# Patient Record
Sex: FEMALE | Race: BLACK OR AFRICAN AMERICAN | NOT HISPANIC OR LATINO | URBAN - METROPOLITAN AREA
[De-identification: names, ages, dates, MRNs, and addresses within clinical notes are randomized per-mention and may not be internally consistent; named-entity substitution may affect disease eponyms.]

---

## 2020-12-02 ENCOUNTER — OUT OF OFFICE VISIT (OUTPATIENT)
Dept: URBAN - METROPOLITAN AREA MEDICAL CENTER 23 | Facility: MEDICAL CENTER | Age: 80
End: 2020-12-02
Payer: MEDICARE

## 2020-12-02 DIAGNOSIS — R13.19 CERVICAL DYSPHAGIA: ICD-10-CM

## 2020-12-02 DIAGNOSIS — R10.13 ABDOMINAL DISCOMFORT, EPIGASTRIC: ICD-10-CM

## 2020-12-02 DIAGNOSIS — D64.89 ANEMIA DUE TO OTHER CAUSE: ICD-10-CM

## 2020-12-02 DIAGNOSIS — R19.5 ABNORMAL FECES: ICD-10-CM

## 2020-12-02 PROCEDURE — G8427 DOCREV CUR MEDS BY ELIG CLIN: HCPCS | Performed by: INTERNAL MEDICINE

## 2020-12-02 PROCEDURE — 99223 1ST HOSP IP/OBS HIGH 75: CPT | Performed by: INTERNAL MEDICINE

## 2020-12-02 PROCEDURE — 99232 SBSQ HOSP IP/OBS MODERATE 35: CPT | Performed by: INTERNAL MEDICINE

## 2021-02-05 ENCOUNTER — OUT OF OFFICE VISIT (OUTPATIENT)
Dept: URBAN - NONMETROPOLITAN AREA MEDICAL CENTER 3 | Facility: MEDICAL CENTER | Age: 81
End: 2021-02-05
Payer: MEDICARE

## 2021-02-05 DIAGNOSIS — R13.10 ABNORMAL SWALLOWING: ICD-10-CM

## 2021-02-05 DIAGNOSIS — D50.9 ANEMIA, IRON DEFICIENCY: ICD-10-CM

## 2021-02-05 DIAGNOSIS — I48.0 INTERMITTENT ATRIAL FIBRILLATION: ICD-10-CM

## 2021-02-05 DIAGNOSIS — Z79.01 ANTICOAGULANT LONG-TERM USE: ICD-10-CM

## 2021-02-05 PROCEDURE — 99223 1ST HOSP IP/OBS HIGH 75: CPT | Performed by: PHYSICIAN ASSISTANT

## 2021-02-05 PROCEDURE — G8427 DOCREV CUR MEDS BY ELIG CLIN: HCPCS | Performed by: PHYSICIAN ASSISTANT

## 2021-02-06 ENCOUNTER — OUT OF OFFICE VISIT (OUTPATIENT)
Dept: URBAN - NONMETROPOLITAN AREA MEDICAL CENTER 3 | Facility: MEDICAL CENTER | Age: 81
End: 2021-02-06
Payer: MEDICARE

## 2021-02-06 DIAGNOSIS — T18.128A FOOD IMPACTION OF ESOPHAGUS: ICD-10-CM

## 2021-02-06 DIAGNOSIS — K22.2 ACQUIRED ESOPHAGEAL RING: ICD-10-CM

## 2021-02-06 PROCEDURE — 43235 EGD DIAGNOSTIC BRUSH WASH: CPT | Performed by: INTERNAL MEDICINE

## 2021-07-08 ENCOUNTER — OUT OF OFFICE VISIT (OUTPATIENT)
Dept: URBAN - METROPOLITAN AREA MEDICAL CENTER 10 | Facility: MEDICAL CENTER | Age: 81
End: 2021-07-08
Payer: MEDICARE

## 2021-07-08 DIAGNOSIS — R55 BLACK-OUT (NOT AMNESIA): ICD-10-CM

## 2021-07-08 DIAGNOSIS — I48.91 A-FIB: ICD-10-CM

## 2021-07-08 DIAGNOSIS — D50.9 ANEMIA, IRON DEFICIENCY: ICD-10-CM

## 2021-07-08 PROCEDURE — G8427 DOCREV CUR MEDS BY ELIG CLIN: HCPCS | Performed by: INTERNAL MEDICINE

## 2021-07-08 PROCEDURE — 99222 1ST HOSP IP/OBS MODERATE 55: CPT | Performed by: INTERNAL MEDICINE

## 2021-07-09 ENCOUNTER — OUT OF OFFICE VISIT (OUTPATIENT)
Dept: URBAN - METROPOLITAN AREA MEDICAL CENTER 10 | Facility: MEDICAL CENTER | Age: 81
End: 2021-07-09
Payer: MEDICARE

## 2021-07-09 DIAGNOSIS — K31.89 ACQUIRED DEFORMITY OF DUODENUM: ICD-10-CM

## 2021-07-09 DIAGNOSIS — R55 BLACK-OUT (NOT AMNESIA): ICD-10-CM

## 2021-07-09 DIAGNOSIS — K31.819 ACQUIRED ARTERIOVENOUS MALFORMATION OF STOMACH: ICD-10-CM

## 2021-07-09 DIAGNOSIS — D62 ACUTE BLOOD LOSS ANEMIA: ICD-10-CM

## 2021-07-09 PROCEDURE — 99231 SBSQ HOSP IP/OBS SF/LOW 25: CPT | Performed by: INTERNAL MEDICINE

## 2021-07-09 PROCEDURE — 43236 UPPR GI SCOPE W/SUBMUC INJ: CPT | Performed by: INTERNAL MEDICINE

## 2021-07-12 ENCOUNTER — OUT OF OFFICE VISIT (OUTPATIENT)
Dept: URBAN - METROPOLITAN AREA MEDICAL CENTER 10 | Facility: MEDICAL CENTER | Age: 81
End: 2021-07-12
Payer: MEDICARE

## 2021-07-12 DIAGNOSIS — D62 ACUTE BLOOD LOSS ANEMIA: ICD-10-CM

## 2021-07-12 DIAGNOSIS — I48.91 A-FIB: ICD-10-CM

## 2021-07-12 DIAGNOSIS — K31.819 ACQUIRED ARTERIOVENOUS MALFORMATION OF STOMACH: ICD-10-CM

## 2021-07-12 PROCEDURE — 99231 SBSQ HOSP IP/OBS SF/LOW 25: CPT | Performed by: INTERNAL MEDICINE

## 2021-09-07 ENCOUNTER — OFFICE VISIT (OUTPATIENT)
Dept: URBAN - METROPOLITAN AREA CLINIC 78 | Facility: CLINIC | Age: 81
End: 2021-09-07
Payer: MEDICARE

## 2021-09-07 VITALS
HEIGHT: 64 IN | DIASTOLIC BLOOD PRESSURE: 94 MMHG | TEMPERATURE: 97.8 F | HEART RATE: 90 BPM | WEIGHT: 117.8 LBS | SYSTOLIC BLOOD PRESSURE: 147 MMHG | BODY MASS INDEX: 20.11 KG/M2

## 2021-09-07 DIAGNOSIS — R42 LIGHTHEADEDNESS: ICD-10-CM

## 2021-09-07 DIAGNOSIS — I48.91 UNSPECIFIED ATRIAL FIBRILLATION: ICD-10-CM

## 2021-09-07 DIAGNOSIS — R13.10 DYSPHAGIA: ICD-10-CM

## 2021-09-07 DIAGNOSIS — Z86.73 H/O: STROKE: ICD-10-CM

## 2021-09-07 DIAGNOSIS — R10.9 ABDOMINAL PAIN: ICD-10-CM

## 2021-09-07 DIAGNOSIS — D64.9 ANEMIA, UNSPECIFIED TYPE: ICD-10-CM

## 2021-09-07 PROCEDURE — 99214 OFFICE O/P EST MOD 30 MIN: CPT | Performed by: INTERNAL MEDICINE

## 2021-09-07 RX ORDER — CARVEDILOL 12.5 MG/1
1 TABLET WITH FOOD TABLET, FILM COATED ORAL TWICE A DAY
Status: ACTIVE | COMMUNITY

## 2021-09-07 NOTE — HPI-TODAY'S VISIT:
I met Mrs. Moreau during an inpatient stay at [facility] in July 2021. She has a history of CVA with left-sided weakness, abdominal aortic aneurysm x2, chronic diastolic congestive heart failure, status post ICD, severe anemia at (baseline Hb~ 7), peptic ulcer disease, dementia, paroxysmal Afib.  She presetned to the hospital with dizziness, nausea and vomiting.  She was found to have a Hb of 6.6.  She was started on PPIs and received 3 units of PRBC transfusion. Her hemoglobin stabilized around 10. 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 Given the concern for additional small bowel AVMs an outpt pillcam was recommended Her anticoagulation was held. She has not yet followed up with Cardiology since her discharge  She tells me that she had been doing well since her discharge. This morning however she had an isolated episode of lightheadeness out of blue. She has been noticing a diffuse pain in her abdomen, 7/10 in intensty.  She denies nausea, diarrehea or constipation. Appetite has been great. She has been having some dysphagia and feels that she should have her esophagus stretched before she attempts to try swallowing the Pillcam.   She last had her blood counts checked ~5 weeks ago. She never heard back regarding those results.    Summary of prior workup: - EGD  by me in july 2021: normal esophagus.  Gastric mucosal atrophy.  Normal cardia and gastric fundus. Duodenal deformity.  A single angioectasia in the duodenum s/p clip and injection with Epi. Duodenal erosions without bleeding. - CT head: no acute process, favored remote right pontine lacunar infarction.  - ECHO: Chronic diastolic congestive heart failure:  LVEF 55% and grade 1 diastolic dysfunction. -Ultrasound of left upper extremity: negative for DVT.

## 2021-09-14 LAB
HEMATOCRIT: 35.3
HEMOGLOBIN: 10.5
MCH: 23.5
MCHC: 29.7
MCV: 79
NRBC: (no result)
PLATELETS: 184
RBC: 4.46
RDW: 25.7
WBC: 5.9

## 2021-10-21 ENCOUNTER — TELEPHONE ENCOUNTER (OUTPATIENT)
Dept: URBAN - METROPOLITAN AREA CLINIC 92 | Facility: CLINIC | Age: 81
End: 2021-10-21

## 2021-10-28 ENCOUNTER — OFFICE VISIT (OUTPATIENT)
Dept: URBAN - METROPOLITAN AREA MEDICAL CENTER 10 | Facility: MEDICAL CENTER | Age: 81
End: 2021-10-28
Payer: MEDICARE

## 2021-10-28 DIAGNOSIS — K31.819 ACQUIRED ARTERIOVENOUS MALFORMATION OF STOMACH: ICD-10-CM

## 2021-10-28 DIAGNOSIS — K22.2 ACQUIRED ESOPHAGEAL RING: ICD-10-CM

## 2021-10-28 DIAGNOSIS — K21.00 ALKALINE REFLUX ESOPHAGITIS: ICD-10-CM

## 2021-10-28 PROCEDURE — 43270 EGD LESION ABLATION: CPT | Performed by: INTERNAL MEDICINE

## 2021-10-28 PROCEDURE — 43239 EGD BIOPSY SINGLE/MULTIPLE: CPT | Performed by: INTERNAL MEDICINE

## 2022-03-02 ENCOUNTER — TELEPHONE ENCOUNTER (OUTPATIENT)
Dept: URBAN - METROPOLITAN AREA CLINIC 78 | Facility: CLINIC | Age: 82
End: 2022-03-02

## 2022-03-25 ENCOUNTER — CLAIMS CREATED FROM THE CLAIM WINDOW (OUTPATIENT)
Dept: URBAN - METROPOLITAN AREA CLINIC 77 | Facility: CLINIC | Age: 82
End: 2022-03-25

## 2022-03-25 ENCOUNTER — OFFICE VISIT (OUTPATIENT)
Dept: URBAN - METROPOLITAN AREA CLINIC 77 | Facility: CLINIC | Age: 82
End: 2022-03-25

## 2022-03-25 ENCOUNTER — CLAIMS CREATED FROM THE CLAIM WINDOW (OUTPATIENT)
Dept: URBAN - METROPOLITAN AREA CLINIC 77 | Facility: CLINIC | Age: 82
End: 2022-03-25
Payer: MEDICARE

## 2022-03-25 DIAGNOSIS — K92.2 ACUTE GASTROINTESTINAL BLEEDING: ICD-10-CM

## 2022-03-25 PROCEDURE — 91110 GI TRC IMG INTRAL ESOPH-ILE: CPT | Performed by: INTERNAL MEDICINE

## 2022-03-25 RX ORDER — CARVEDILOL 12.5 MG/1
1 TABLET WITH FOOD TABLET, FILM COATED ORAL TWICE A DAY
Status: ACTIVE | COMMUNITY

## 2022-03-28 ENCOUNTER — TELEPHONE ENCOUNTER (OUTPATIENT)
Dept: URBAN - METROPOLITAN AREA CLINIC 78 | Facility: CLINIC | Age: 82
End: 2022-03-28

## 2022-04-04 ENCOUNTER — TELEPHONE ENCOUNTER (OUTPATIENT)
Dept: URBAN - METROPOLITAN AREA CLINIC 78 | Facility: CLINIC | Age: 82
End: 2022-04-04

## 2022-04-28 ENCOUNTER — OFFICE VISIT (OUTPATIENT)
Dept: URBAN - METROPOLITAN AREA CLINIC 78 | Facility: CLINIC | Age: 82
End: 2022-04-28

## 2022-09-13 ENCOUNTER — TELEPHONE ENCOUNTER (OUTPATIENT)
Dept: URBAN - METROPOLITAN AREA CLINIC 78 | Facility: CLINIC | Age: 82
End: 2022-09-13

## 2022-09-19 ENCOUNTER — TELEPHONE ENCOUNTER (OUTPATIENT)
Dept: URBAN - METROPOLITAN AREA CLINIC 78 | Facility: CLINIC | Age: 82
End: 2022-09-19

## 2022-09-23 ENCOUNTER — OFFICE VISIT (OUTPATIENT)
Dept: URBAN - METROPOLITAN AREA CLINIC 77 | Facility: CLINIC | Age: 82
End: 2022-09-23

## 2022-10-25 ENCOUNTER — OFFICE VISIT (OUTPATIENT)
Dept: URBAN - METROPOLITAN AREA CLINIC 78 | Facility: CLINIC | Age: 82
End: 2022-10-25
Payer: MEDICARE

## 2022-10-25 VITALS
DIASTOLIC BLOOD PRESSURE: 91 MMHG | HEART RATE: 83 BPM | WEIGHT: 115 LBS | SYSTOLIC BLOOD PRESSURE: 161 MMHG | HEIGHT: 64 IN | BODY MASS INDEX: 19.63 KG/M2 | TEMPERATURE: 98.1 F

## 2022-10-25 DIAGNOSIS — R42 LIGHTHEADEDNESS: ICD-10-CM

## 2022-10-25 DIAGNOSIS — R10.9 ABDOMINAL PAIN: ICD-10-CM

## 2022-10-25 DIAGNOSIS — R13.10 DYSPHAGIA: ICD-10-CM

## 2022-10-25 DIAGNOSIS — I48.91 UNSPECIFIED ATRIAL FIBRILLATION: ICD-10-CM

## 2022-10-25 DIAGNOSIS — Z86.73 H/O: STROKE: ICD-10-CM

## 2022-10-25 DIAGNOSIS — D64.9 ANEMIA, UNSPECIFIED TYPE: ICD-10-CM

## 2022-10-25 PROBLEM — 40739000 DYSPHAGIA: Status: ACTIVE | Noted: 2021-09-07

## 2022-10-25 PROBLEM — 49436004 ATRIAL FIBRILLATION: Status: ACTIVE | Noted: 2021-09-07

## 2022-10-25 PROCEDURE — 99214 OFFICE O/P EST MOD 30 MIN: CPT | Performed by: INTERNAL MEDICINE

## 2022-10-25 RX ORDER — FUROSEMIDE 20 MG/1
1 TABLET TABLET ORAL ONCE A DAY
Status: ACTIVE | COMMUNITY

## 2022-10-25 RX ORDER — CARVEDILOL 12.5 MG/1
1 TABLET WITH FOOD TABLET, FILM COATED ORAL TWICE A DAY
Status: ACTIVE | COMMUNITY

## 2022-10-25 NOTE — PHYSICAL EXAM CONSTITUTIONAL:
well developed, well nourished , in no acute distress , on a wheelchair due to generalized weakness, normal communication ability

## 2022-10-25 NOTE — PHYSICAL EXAM GASTROINTESTINAL
Abdomen , soft, tender to palpation along the epigastric region, nondistended , no guarding or rigidity , no masses palpable , normal bowel sounds , Liver and Spleen , no hepatomegaly present , no hepatosplenomegaly , liver nontender , spleen not palpable

## 2022-10-25 NOTE — HPI-TODAY'S VISIT:
I met Mrs. Moreau during an inpatient stay at [facility] in July 2021. She has a history of CVA with left-sided weakness, abdominal aortic aneurysm x2, chronic diastolic congestive heart failure, status post ICD, severe anemia at (baseline Hb~ 7), peptic ulcer disease, dementia, paroxysmal Afib.  She presented to the hospital with dizziness, nausea and vomiting.  She was found to have a Hb of 6.6. She was started on PPIs and received 3 units of PRBC transfusion. Her hemoglobin stabilized around 10. 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 Given the concern for additional small bowel AVMs an outpt pillcam was recommended  Pillcam unfortunately was unsuccsessful as the pill was retained in the esoph/stomach for almost the duration of the 8 hours.  She has not had melena, but continues to complain of THOMAS and lightheadedness. She denies much in the way of abdominal pain. Appetite has been fair.   She will be seeing Dr. Traore following this visit to discuss recent blood results and determine the further plan of action.   Summary of prior workup: - Pillcam on 3/25/22: The pillcam was retained in the esophagus for 4 h 15 mins and then in the stomach for 2 h 32 mins. Incomplete evaluation of the small intestine as the VCE did not reach the cecum. No overt bleeding noted; no angiectasias/polyps or mass. - EGD by me on 10/28/2021: White esoph lesions suggestive but histopathologically not consistent with Candida esophagitis, a benign-appearing intrinsic stenosis dilated to 18 mm at the GE junction, diffuse atrophic gastritis, patent pyloric channel.  Single small AVM in the duodenal bulb status post APC.  Normal second portion of the duodenum. - CBC on 9/14/2021: WBC 5.9, hemoglobin 10.5, MCV 79, platelets 184. - EGD  by me in july 2021: normal esophagus.  Gastric mucosal atrophy.  Normal cardia and gastric fundus. Duodenal deformity.  A single angioectasia in the duodenum s/p clip and injection with Epi. Duodenal erosions without bleeding. - CT head: no acute process, favored remote right pontine lacunar infarction.  - ECHO: Chronic diastolic congestive heart failure:  LVEF 55% and grade 1 diastolic dysfunction. -Ultrasound of left upper extremity: negative for DVT.

## 2022-11-01 ENCOUNTER — TELEPHONE ENCOUNTER (OUTPATIENT)
Dept: URBAN - METROPOLITAN AREA CLINIC 113 | Facility: CLINIC | Age: 82
End: 2022-11-01

## 2022-11-09 PROBLEM — 87522002 IRON DEFICIENCY ANEMIA: Status: ACTIVE | Noted: 2022-11-09

## 2023-01-02 PROBLEM — 271737000 ANEMIA: Status: ACTIVE | Noted: 2023-01-02

## 2023-01-05 ENCOUNTER — TELEPHONE ENCOUNTER (OUTPATIENT)
Dept: URBAN - METROPOLITAN AREA CLINIC 78 | Facility: CLINIC | Age: 83
End: 2023-01-05

## 2023-01-10 ENCOUNTER — OFFICE VISIT (OUTPATIENT)
Dept: URBAN - METROPOLITAN AREA MEDICAL CENTER 10 | Facility: MEDICAL CENTER | Age: 83
End: 2023-01-10

## 2023-05-30 ENCOUNTER — OFFICE VISIT (OUTPATIENT)
Dept: URBAN - METROPOLITAN AREA CLINIC 25 | Facility: CLINIC | Age: 83
End: 2023-05-30

## 2023-06-09 ENCOUNTER — OFFICE VISIT (OUTPATIENT)
Dept: URBAN - METROPOLITAN AREA CLINIC 25 | Facility: CLINIC | Age: 83
End: 2023-06-09
Payer: MEDICARE

## 2023-06-09 ENCOUNTER — DASHBOARD ENCOUNTERS (OUTPATIENT)
Age: 83
End: 2023-06-09

## 2023-06-09 ENCOUNTER — WEB ENCOUNTER (OUTPATIENT)
Dept: URBAN - METROPOLITAN AREA CLINIC 25 | Facility: CLINIC | Age: 83
End: 2023-06-09

## 2023-06-09 VITALS
BODY MASS INDEX: 19.81 KG/M2 | SYSTOLIC BLOOD PRESSURE: 120 MMHG | HEART RATE: 74 BPM | DIASTOLIC BLOOD PRESSURE: 77 MMHG | WEIGHT: 116 LBS | TEMPERATURE: 97.7 F | HEIGHT: 64 IN

## 2023-06-09 DIAGNOSIS — Z86.73 H/O: STROKE: ICD-10-CM

## 2023-06-09 DIAGNOSIS — Z79.01 CURRENT USE OF LONG TERM ANTICOAGULATION: ICD-10-CM

## 2023-06-09 DIAGNOSIS — R10.9 ABDOMINAL PAIN: ICD-10-CM

## 2023-06-09 DIAGNOSIS — D50.0 IRON DEFICIENCY ANEMIA DUE TO CHRONIC BLOOD LOSS: ICD-10-CM

## 2023-06-09 DIAGNOSIS — I48.91 UNSPECIFIED ATRIAL FIBRILLATION: ICD-10-CM

## 2023-06-09 DIAGNOSIS — I99.8 ANGIECTASIA: ICD-10-CM

## 2023-06-09 PROBLEM — 724556004: Status: ACTIVE | Noted: 2023-06-09

## 2023-06-09 PROCEDURE — 99214 OFFICE O/P EST MOD 30 MIN: CPT

## 2023-06-09 RX ORDER — CLOPIDOGREL BISULFATE 75 MG/1
1 TABLET TABLET ORAL ONCE A DAY
Status: ACTIVE | COMMUNITY

## 2023-06-09 RX ORDER — FERROUS SULFATE TAB 325 MG (65 MG ELEMENTAL FE) 325 (65 FE) MG
1 TABLET TAB ORAL
Status: ACTIVE | COMMUNITY

## 2023-06-09 RX ORDER — MECLIZINE HYDROCHLORIDE 25 MG/1
1 TABLET AS NEEDED TABLET ORAL
Status: ACTIVE | COMMUNITY

## 2023-06-09 RX ORDER — DOCUSATE SODIUM 100 MG/1
1 CAPSULE AS NEEDED CAPSULE, LIQUID FILLED ORAL ONCE A DAY
Status: ACTIVE | COMMUNITY

## 2023-06-09 RX ORDER — FUROSEMIDE 20 MG/1
1 TABLET TABLET ORAL ONCE A DAY
Status: ACTIVE | COMMUNITY

## 2023-06-09 RX ORDER — ATORVASTATIN CALCIUM 10 MG/1
1 TABLET TABLET, FILM COATED ORAL ONCE A DAY
Status: ACTIVE | COMMUNITY

## 2023-06-09 RX ORDER — POTASSIUM CHLORIDE 20 MEQ/15ML
15 ML WITH FOOD SOLUTION ORAL ONCE A DAY
Status: ACTIVE | COMMUNITY

## 2023-06-09 RX ORDER — CARVEDILOL 12.5 MG/1
1 TABLET WITH FOOD TABLET, FILM COATED ORAL TWICE A DAY
Status: ACTIVE | COMMUNITY

## 2023-06-09 RX ORDER — ASPIRIN 81 MG/1
1 TABLET TABLET, COATED ORAL ONCE A DAY
Status: ACTIVE | COMMUNITY

## 2023-06-09 RX ORDER — GLIPIZIDE 5 MG/1
1 TABLET 30 MINUTES BEFORE BREAKFAST TABLET ORAL ONCE A DAY
Status: ACTIVE | COMMUNITY

## 2023-06-09 NOTE — HPI-OTHER HISTORIES
10/22 OV I met Mrs. Moreau during an inpatient stay at [facility] in July 2021. She has a history of CVA with left-sided weakness, abdominal aortic aneurysm x2, chronic diastolic congestive heart failure, status post ICD, severe anemia at (baseline Hb~ 7), peptic ulcer disease, dementia, paroxysmal Afib.  She presented to the hospital with dizziness, nausea and vomiting.  She was found to have a Hb of 6.6. She was started on PPIs and received 3 units of PRBC transfusion. Her hemoglobin stabilized around 10. 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 Given the concern for additional small bowel AVMs an outpt pillcam was recommended  Pillcam unfortunately was unsuccsessful as the pill was retained in the esoph/stomach for almost the duration of the 8 hours.  She has not had melena, but continues to complain of THOMAS and lightheadedness. She denies much in the way of abdominal pain. Appetite has been fair.   She will be seeing Dr. Traore following this visit to discuss recent blood results and determine the further plan of action.   Summary of prior workup: - Pillcam on 3/25/22: The pillcam was retained in the esophagus for 4 h 15 mins and then in the stomach for 2 h 32 mins. Incomplete evaluation of the small intestine as the VCE did not reach the cecum. No overt bleeding noted; no angiectasias/polyps or mass. - EGD by me on 10/28/2021: White esoph lesions suggestive but histopathologically not consistent with Candida esophagitis, a benign-appearing intrinsic stenosis dilated to 18 mm at the GE junction, diffuse atrophic gastritis, patent pyloric channel.  Single small AVM in the duodenal bulb status post APC.  Normal second portion of the duodenum. - CBC on 9/14/2021: WBC 5.9, hemoglobin 10.5, MCV 79, platelets 184. - EGD  by me in july 2021: normal esophagus.  Gastric mucosal atrophy.  Normal cardia and gastric fundus. Duodenal deformity.  A single angioectasia in the duodenum s/p clip and injection with Epi. Duodenal erosions without bleeding. - CT head: no acute process, favored remote right pontine lacunar infarction.  - ECHO: Chronic diastolic congestive heart failure:  LVEF 55% and grade 1 diastolic dysfunction. -Ultrasound of left upper extremity: negative for DVT.

## 2023-06-09 NOTE — HPI-TODAY'S VISIT:
Ms. Moreau is an established patient of Dr. Roman, she has a pmhx of multiple CVA, with residual left sided weakness and dysarthria, anemia, CHF, diabetes, aortic aneurysm, generalized muscle weakness, gait instability, hypertension status post AICD she presents today following a recent stroke in 02/23 where she was evaluated for lightheadedness and weakness, she has a hx of LARISA and gastric angioectasias, repeat EGD/colon was suggested in October, however following patients stroke she was not a candidate for either procedures, previous pillcam study was incomplete d/t retention of pill cam before reaching the cecum. The patient has microcytic anemia, hgb of 9.4, mcv of 74.6, and hct of 30.3, Dr. Lopes diagnosed Ms. Moreau w/ a left-sided colonic ileus in March, however, she is having loose bowel movements and passing gas, the patient is complaining of LLQ abdominal pain  (+) Change in bowel habits  - Daughter present and main historian  - Reports normal stools, however over the last 2 years her stools have become loose, she has been having watery diarrhea consistently, and her regular stools are mushier  - She complains of consistent LLQ abdominal pain  - She will have 14 BM a week, she states that occasionally she will have pebble-like stools, she states this occurs once every few weeks, she also confirms some mucus in her stool  - Patient is capable of passing gas without any issues  - LLQ abdominal pain is intermittent and feels like a cramping aching pain, she experiences this pain daily  - Patient also complains of incontinence, she will have leakage of her stools occasionally   - Patient denies BRBPR, melena/coffee ground stools, unintentional weight loss, N/V  - Patient has been off Eliquis for 7 months now, she was given a watchman installed, she also has a defibrillator in place  - LARISA followed by   - Cardiologist: Dr. Wesley    EGD 10/21  Z-line regular, 40 cm from the incisors. - Benign-appearing esophageal stenosis. Dilated. - Gastric mucosal atrophy. - A single non-bleeding angiodysplasia lesion in the duodenal. Treated with argon (APC). - Normal second portion of the duodenum.  EGD 07/21  Normal cardia and gastric fundus. - Duodenal deformity. - A single angioectasia in the duodenum, Clip was placed. Injected - Normal area of the papilla. - A single non-bleeding angioectasia in the duodenum. - Duodenal erosions without bleeding. - No specimens collected.  Pull cam 2022  1. Delayed passage of the PillCam from the esophagus into the stomach. It was retained in the esophagus for about 4 hours and 15 minutes. 2. Limited visualization of the stomach given large amount of debris. 3. Incomplete evaluation of the small intestine as the PillCam did not reach the cecum. No overt bleeding was noted in the small intestine nor were there any obvious angiectasias, polyps or mass.  CT Head WO IV Contrast 02/23  Final Result 1. No acute intracranial abnormality. 2. Severe chronic microvascular ischemic changes of the brain. 3. Unchanged chronic lacunar infarctions, as detailed above.

## 2023-06-22 PROBLEM — 711150003: Status: ACTIVE | Noted: 2023-06-22

## 2023-06-23 ENCOUNTER — TELEPHONE ENCOUNTER (OUTPATIENT)
Dept: URBAN - METROPOLITAN AREA CLINIC 25 | Facility: CLINIC | Age: 83
End: 2023-06-23

## 2023-06-23 RX ORDER — POTASSIUM CHLORIDE 20 MEQ/15ML
15 ML WITH FOOD SOLUTION ORAL ONCE A DAY
Status: ACTIVE | COMMUNITY

## 2023-06-23 RX ORDER — CLOPIDOGREL BISULFATE 75 MG/1
1 TABLET TABLET ORAL ONCE A DAY
Status: ACTIVE | COMMUNITY

## 2023-06-23 RX ORDER — ATORVASTATIN CALCIUM 10 MG/1
1 TABLET TABLET, FILM COATED ORAL ONCE A DAY
Status: ACTIVE | COMMUNITY

## 2023-06-23 RX ORDER — GLIPIZIDE 5 MG/1
1 TABLET 30 MINUTES BEFORE BREAKFAST TABLET ORAL ONCE A DAY
Status: ACTIVE | COMMUNITY

## 2023-06-23 RX ORDER — POLYETHYLENE GLYCOL 3350, SODIUM SULFATE ANHYDROUS, SODIUM BICARBONATE, SODIUM CHLORIDE, POTASSIUM CHLORIDE 236; 22.74; 6.74; 5.86; 2.97 G/4L; G/4L; G/4L; G/4L; G/4L
AS DIRECTED POWDER, FOR SOLUTION ORAL ONCE
Qty: 1 | Refills: 0 | OUTPATIENT
Start: 2023-07-18 | End: 2023-07-19

## 2023-06-23 RX ORDER — DOCUSATE SODIUM 100 MG/1
1 CAPSULE AS NEEDED CAPSULE, LIQUID FILLED ORAL ONCE A DAY
Status: ACTIVE | COMMUNITY

## 2023-06-23 RX ORDER — ASPIRIN 81 MG/1
1 TABLET TABLET, COATED ORAL ONCE A DAY
Status: ACTIVE | COMMUNITY

## 2023-06-23 RX ORDER — FERROUS SULFATE TAB 325 MG (65 MG ELEMENTAL FE) 325 (65 FE) MG
1 TABLET TAB ORAL
Status: ACTIVE | COMMUNITY

## 2023-06-23 RX ORDER — MECLIZINE HYDROCHLORIDE 25 MG/1
1 TABLET AS NEEDED TABLET ORAL
Status: ACTIVE | COMMUNITY

## 2023-06-23 RX ORDER — FUROSEMIDE 20 MG/1
1 TABLET TABLET ORAL ONCE A DAY
Status: ACTIVE | COMMUNITY

## 2023-06-23 RX ORDER — CARVEDILOL 12.5 MG/1
1 TABLET WITH FOOD TABLET, FILM COATED ORAL TWICE A DAY
Status: ACTIVE | COMMUNITY

## 2023-07-18 ENCOUNTER — LAB OUTSIDE AN ENCOUNTER (OUTPATIENT)
Dept: URBAN - METROPOLITAN AREA CLINIC 25 | Facility: CLINIC | Age: 83
End: 2023-07-18

## 2023-07-25 NOTE — PHYSICAL EXAM CARDIOVASCULAR:
no edema,  no murmurs,  regular rate and rhythm , no edema.
Detail Level: Detailed
Plan: Dermeleve cream

## 2023-08-09 ENCOUNTER — TELEPHONE ENCOUNTER (OUTPATIENT)
Dept: URBAN - METROPOLITAN AREA CLINIC 25 | Facility: CLINIC | Age: 83
End: 2023-08-09

## 2023-08-21 ENCOUNTER — TELEPHONE ENCOUNTER (OUTPATIENT)
Dept: URBAN - METROPOLITAN AREA CLINIC 25 | Facility: CLINIC | Age: 83
End: 2023-08-21

## 2023-08-21 ENCOUNTER — OFFICE VISIT (OUTPATIENT)
Dept: URBAN - METROPOLITAN AREA MEDICAL CENTER 8 | Facility: MEDICAL CENTER | Age: 83
End: 2023-08-21
Payer: MEDICARE

## 2023-08-21 DIAGNOSIS — K29.60 ADENOPAPILLOMATOSIS GASTRICA: ICD-10-CM

## 2023-08-21 DIAGNOSIS — D50.9 ANEMIA: ICD-10-CM

## 2023-08-21 DIAGNOSIS — K31.89 OTHER DISEASES OF STOMACH AND DUODENUM: ICD-10-CM

## 2023-08-21 PROCEDURE — 45378 DIAGNOSTIC COLONOSCOPY: CPT | Performed by: INTERNAL MEDICINE

## 2023-08-21 PROCEDURE — 43239 EGD BIOPSY SINGLE/MULTIPLE: CPT | Performed by: INTERNAL MEDICINE

## 2023-08-21 RX ORDER — FERROUS SULFATE TAB 325 MG (65 MG ELEMENTAL FE) 325 (65 FE) MG
1 TABLET TAB ORAL
Status: ACTIVE | COMMUNITY

## 2023-08-21 RX ORDER — OMEPRAZOLE 20 MG/1
1 CAPSULE CAPSULE, DELAYED RELEASE ORAL TWICE A DAY
Qty: 180 CAPSULE | Refills: 0 | OUTPATIENT
Start: 2023-08-21

## 2023-08-21 RX ORDER — FUROSEMIDE 20 MG/1
1 TABLET TABLET ORAL ONCE A DAY
Status: ACTIVE | COMMUNITY

## 2023-08-21 RX ORDER — ASPIRIN 81 MG/1
1 TABLET TABLET, COATED ORAL ONCE A DAY
Status: ACTIVE | COMMUNITY

## 2023-08-21 RX ORDER — CLOPIDOGREL BISULFATE 75 MG/1
1 TABLET TABLET ORAL ONCE A DAY
Status: ACTIVE | COMMUNITY

## 2023-08-21 RX ORDER — CARVEDILOL 12.5 MG/1
1 TABLET WITH FOOD TABLET, FILM COATED ORAL TWICE A DAY
Status: ACTIVE | COMMUNITY

## 2023-08-21 RX ORDER — DOCUSATE SODIUM 100 MG/1
1 CAPSULE AS NEEDED CAPSULE, LIQUID FILLED ORAL ONCE A DAY
Status: ACTIVE | COMMUNITY

## 2023-08-21 RX ORDER — MECLIZINE HYDROCHLORIDE 25 MG/1
1 TABLET AS NEEDED TABLET ORAL
Status: ACTIVE | COMMUNITY

## 2023-08-21 RX ORDER — POTASSIUM CHLORIDE 20 MEQ/15ML
15 ML WITH FOOD SOLUTION ORAL ONCE A DAY
Status: ACTIVE | COMMUNITY

## 2023-08-21 RX ORDER — ATORVASTATIN CALCIUM 10 MG/1
1 TABLET TABLET, FILM COATED ORAL ONCE A DAY
Status: ACTIVE | COMMUNITY

## 2023-08-21 RX ORDER — GLIPIZIDE 5 MG/1
1 TABLET 30 MINUTES BEFORE BREAKFAST TABLET ORAL ONCE A DAY
Status: ACTIVE | COMMUNITY